# Patient Record
Sex: MALE | Race: WHITE | NOT HISPANIC OR LATINO | Employment: FULL TIME | URBAN - METROPOLITAN AREA
[De-identification: names, ages, dates, MRNs, and addresses within clinical notes are randomized per-mention and may not be internally consistent; named-entity substitution may affect disease eponyms.]

---

## 2023-01-16 ENCOUNTER — HOSPITAL ENCOUNTER (EMERGENCY)
Facility: HOSPITAL | Age: 18
Discharge: HOME | End: 2023-01-16
Attending: FAMILY MEDICINE
Payer: COMMERCIAL

## 2023-01-16 VITALS
HEART RATE: 72 BPM | RESPIRATION RATE: 17 BRPM | BODY MASS INDEX: 23.02 KG/M2 | TEMPERATURE: 98 F | DIASTOLIC BLOOD PRESSURE: 78 MMHG | OXYGEN SATURATION: 98 % | WEIGHT: 195 LBS | SYSTOLIC BLOOD PRESSURE: 124 MMHG | HEIGHT: 77 IN

## 2023-01-16 DIAGNOSIS — T40.2X1A OPIOID OVERDOSE, ACCIDENTAL OR UNINTENTIONAL, INITIAL ENCOUNTER: Primary | ICD-10-CM

## 2023-01-16 PROCEDURE — 99283 EMERGENCY DEPT VISIT LOW MDM: CPT

## 2023-01-16 RX ORDER — NALOXONE HYDROCHLORIDE 4 MG/.1ML
SPRAY NASAL
Qty: 1 EACH | Refills: 11 | Status: SHIPPED | OUTPATIENT
Start: 2023-01-16

## 2023-01-16 NOTE — ED PROVIDER NOTES
"Encounter Date: 1/16/2023       History     Chief Complaint   Patient presents with    Drug Overdose     Brought in by ems for [place], [place]. Given 6 gm narcan IM and 6 mg narcan IV. Pt alert and oriented at present. Reports "shaking" at present. Pt reports using what he thought was cocaine.      17-year-old male drove into the emergency room by ambulance for evaluation due to opioid overdose.  Patient reports that he thought he was using cocaine, and became unresponsive.  Patient received 6 mg of Narcan IM and 6 mg of Narcan IV via EMS.  Patient currently awake and alert.  Denies any intention of harming himself, reports that it was accidental.    The history is provided by the patient.   Review of patient's allergies indicates:  No Known Allergies  History reviewed. No pertinent past medical history.  History reviewed. No pertinent surgical history.  No family history on file.  Social History     Tobacco Use    Smoking status: Every Day     Types: Vaping with nicotine    Smokeless tobacco: Never   Substance Use Topics    Alcohol use: Yes     Review of Systems   Constitutional:  Negative for chills, fatigue and fever.   HENT:  Negative for ear pain, rhinorrhea and sore throat.    Eyes:  Negative for photophobia and pain.   Respiratory:  Negative for cough, shortness of breath and wheezing.    Cardiovascular:  Negative for chest pain.   Gastrointestinal:  Negative for abdominal pain, diarrhea, nausea and vomiting.   Genitourinary:  Negative for dysuria.   Neurological:  Negative for dizziness, weakness and headaches.   All other systems reviewed and are negative.    Physical Exam     Initial Vitals [01/16/23 0251]   BP Pulse Resp Temp SpO2   132/81 93 16 98.2 °F (36.8 °C) 100 %      MAP       --         Physical Exam    Nursing note and vitals reviewed.  Constitutional: He appears well-developed and well-nourished.   HENT:   Head: Normocephalic and atraumatic.   Eyes: EOM are normal. Pupils are " equal, round, and reactive to light.   Neck: Neck supple.   Normal range of motion.  Cardiovascular:  Normal rate, regular rhythm, normal heart sounds and intact distal pulses.     Exam reveals no gallop and no friction rub.       No murmur heard.  Pulmonary/Chest: Breath sounds normal. No respiratory distress.   Abdominal: Abdomen is soft. Bowel sounds are normal. He exhibits no distension. There is no abdominal tenderness.   Musculoskeletal:         General: Normal range of motion.      Cervical back: Normal range of motion and neck supple.     Neurological: He is alert and oriented to person, place, and time. He has normal strength.   Skin: Skin is warm and dry.   Psychiatric: He has a normal mood and affect. His behavior is normal. Judgment and thought content normal.       ED Course   Procedures  Labs Reviewed - No data to display         Imaging Results    None          Medications - No data to display  Medical Decision Making:   Initial Assessment:   Patient currently awake and alert in no distress.  Will monitor patient in the emergency room for 2 hours to ensure that patient does not become apneic again.  Currently GCS is 15.  Suspect that the cocaine patient was using contain fentanyl.           ED Course as of 01/17/23 2045 Mon Jan 16, 2023   0442 Patient has been monitored in the emergency room for over 2 hours remains awake and alert and in no distress.  Eating and drinking well without difficulty.  Stable for discharge home. [MW]      ED Course User Index  [MW] Ruben Cerna MD                 Clinical Impression:   Final diagnoses:  [T40.2X1A] Opioid overdose, accidental or unintentional, initial encounter (Primary)        ED Disposition Condition    Discharge Stable          ED Prescriptions       Medication Sig Dispense Start Date End Date Auth. Provider    naloxone (NARCAN) 4 mg/actuation Spry 4mg by nasal route as needed for opioid overdose; may repeat every 2-3 minutes in alternating  nostrils until medical help arrives. Call 911 1 each 1/16/2023 -- Ruben Cerna MD          Follow-up Information       Follow up With Specialties Details Why Contact Info    [facility] - Emergency Dept Emergency Medicine  As needed, If symptoms worsen [address] [facility] [phone] [phone]    Primary Care Physician  In 5 days               Ruben Cerna MD  01/17/23 204

## 2024-08-02 ENCOUNTER — OFFICE VISIT (OUTPATIENT)
Dept: URGENT CARE | Facility: CLINIC | Age: 19
End: 2024-08-02
Payer: COMMERCIAL

## 2024-08-02 VITALS
HEIGHT: 77 IN | RESPIRATION RATE: 20 BRPM | OXYGEN SATURATION: 100 % | TEMPERATURE: 98 F | SYSTOLIC BLOOD PRESSURE: 144 MMHG | HEART RATE: 110 BPM | DIASTOLIC BLOOD PRESSURE: 78 MMHG | WEIGHT: 200 LBS | BODY MASS INDEX: 23.62 KG/M2

## 2024-08-02 DIAGNOSIS — Z20.2 ENCOUNTER FOR ASSESSMENT OF STD EXPOSURE: Primary | ICD-10-CM

## 2024-08-02 LAB
C TRACH DNA SPEC QL NAA+PROBE: NOT DETECTED
N GONORRHOEA DNA SPEC QL NAA+PROBE: NOT DETECTED
SOURCE (OHS): NORMAL

## 2024-08-02 PROCEDURE — 87491 CHLMYD TRACH DNA AMP PROBE: CPT

## 2024-08-02 PROCEDURE — 99214 OFFICE O/P EST MOD 30 MIN: CPT | Mod: PBBFAC

## 2024-08-02 PROCEDURE — 87661 TRICHOMONAS VAGINALIS AMPLIF: CPT

## 2024-08-02 PROCEDURE — 87591 N.GONORRHOEAE DNA AMP PROB: CPT

## 2024-08-02 NOTE — PROGRESS NOTES
"Subjective:       Patient ID: Juan Madrigal is a 19 y.o. male.    Vitals:  height is 6' 5" (1.956 m) and weight is 90.7 kg (200 lb). His oral temperature is 98.1 °F (36.7 °C). His blood pressure is 144/78 (abnormal) and his pulse is 110. His respiration is 20 and oxygen saturation is 100%.     Chief Complaint: STD Testing (Patient requesting STD testing, denies symptoms at this time. )    19-year-old male presents to the clinic requesting STD testing today.  He denies fever, abdominal pain, dysuria, penile discharge, rash, lesions, sores, or any other symptoms at this time.  He reports about 8 months ago he had unprotected sex with a partner who recently notified him that she tested positive for chlamydia so patient would like chlamydia testing at today's visit.    All other systems are negative    Chart reviewed    Objective:   Physical Exam   Constitutional: He appears well-developed.  Non-toxic appearance. He does not appear ill. No distress.   Cardiovascular: Regular rhythm and normal heart sounds.   Pulmonary/Chest: Effort normal and breath sounds normal.   Abdominal: Bowel sounds are normal. He exhibits no distension. Soft. There is no abdominal tenderness.   Musculoskeletal: Normal range of motion.         General: Normal range of motion.   Skin: Skin is warm, dry and not diaphoretic.   Nursing note and vitals reviewed.      Assessment:     1. Encounter for assessment of STD exposure            Plan:   Will notify patient of any positive results on testing and treat accordingly.  Patient can follow all test results on the patient portal.     Please practice safe sex and read patient education material. Follow up with PCP or return to urgent care if symptoms are not improving in several days. Go to the ER if symptoms worsen or new symptoms develop.       Encounter for assessment of STD exposure  -     Chlamydia/GC, PCR  -     Trichomonas vaginalis Amplified RNA        Please note: This chart was completed via " voice to text dictation. It may contain typographical/word recognition errors. If there are any questions, please contact the provider for final clarification.

## 2024-08-02 NOTE — PATIENT INSTRUCTIONS
Will notify patient of any positive results on testing and treat accordingly.  Patient can follow all test results on the patient portal.     Please practice safe sex and read patient education material. Follow up with PCP or return to urgent care if symptoms are not improving in several days. Go to the ER if symptoms worsen or new symptoms develop.

## 2024-08-03 LAB
SPECIMEN SOURCE: NORMAL
T VAGINALIS RRNA SPEC QL NAA+PROBE: NEGATIVE